# Patient Record
Sex: FEMALE | Race: WHITE | Employment: OTHER | URBAN - NONMETROPOLITAN AREA
[De-identification: names, ages, dates, MRNs, and addresses within clinical notes are randomized per-mention and may not be internally consistent; named-entity substitution may affect disease eponyms.]

---

## 2017-10-19 ENCOUNTER — HOSPITAL ENCOUNTER (OUTPATIENT)
Dept: INTERVENTIONAL RADIOLOGY/VASCULAR | Age: 82
Discharge: HOME | End: 2017-10-19
Payer: MEDICARE

## 2017-10-19 VITALS
TEMPERATURE: 97.3 F | HEART RATE: 73 BPM | OXYGEN SATURATION: 97 % | SYSTOLIC BLOOD PRESSURE: 123 MMHG | RESPIRATION RATE: 16 BRPM | DIASTOLIC BLOOD PRESSURE: 80 MMHG

## 2017-10-19 PROCEDURE — 62323 NJX INTERLAMINAR LMBR/SAC: CPT | Performed by: RADIOLOGY

## 2017-10-19 PROCEDURE — 6360000002 HC RX W HCPCS

## 2017-10-19 PROCEDURE — 2500000003 HC RX 250 WO HCPCS

## 2017-10-19 PROCEDURE — 6360000004 HC RX CONTRAST MEDICATION: Performed by: RADIOLOGY

## 2017-10-19 RX ORDER — TRAMADOL HYDROCHLORIDE 50 MG/1
50 TABLET ORAL EVERY 6 HOURS PRN
COMMUNITY
End: 2020-06-01

## 2017-10-19 RX ORDER — FENOFIBRATE 160 MG/1
160 TABLET ORAL DAILY
COMMUNITY

## 2017-10-19 RX ORDER — BUPIVACAINE HYDROCHLORIDE 2.5 MG/ML
5 INJECTION, SOLUTION EPIDURAL; INFILTRATION; INTRACAUDAL ONCE
Status: COMPLETED | OUTPATIENT
Start: 2017-10-19 | End: 2017-10-19

## 2017-10-19 RX ORDER — METHYLPREDNISOLONE ACETATE 80 MG/ML
80 INJECTION, SUSPENSION INTRA-ARTICULAR; INTRALESIONAL; INTRAMUSCULAR; SOFT TISSUE ONCE
Status: COMPLETED | OUTPATIENT
Start: 2017-10-19 | End: 2017-10-19

## 2017-10-19 RX ORDER — FLUOXETINE HYDROCHLORIDE 20 MG/1
20 CAPSULE ORAL DAILY
COMMUNITY
End: 2019-10-03

## 2017-10-19 RX ORDER — MULTIVIT-MINS NO.63/IRON/FOLIC 27 MG-1 MG
TABLET ORAL
COMMUNITY

## 2017-10-19 RX ORDER — PRAVASTATIN SODIUM 80 MG/1
80 TABLET ORAL DAILY
COMMUNITY

## 2017-10-19 RX ORDER — CARVEDILOL 6.25 MG/1
6.25 TABLET ORAL 2 TIMES DAILY
COMMUNITY

## 2017-10-19 RX ADMIN — METHYLPREDNISOLONE ACETATE 80 MG: 80 INJECTION, SUSPENSION INTRA-ARTICULAR; INTRALESIONAL; INTRAMUSCULAR; SOFT TISSUE at 10:56

## 2017-10-19 RX ADMIN — IOHEXOL 1 ML: 180 INJECTION INTRAVENOUS at 10:56

## 2017-10-19 RX ADMIN — BUPIVACAINE HYDROCHLORIDE 2.5 MG: 2.5 INJECTION, SOLUTION EPIDURAL; INFILTRATION; INTRACAUDAL at 10:56

## 2017-10-19 ASSESSMENT — PAIN SCALES - GENERAL
PAINLEVEL_OUTOF10: 0
PAINLEVEL_OUTOF10: 0
PAINLEVEL_OUTOF10: 5
PAINLEVEL_OUTOF10: 0

## 2017-10-19 NOTE — PROGRESS NOTES
Department of Radiology  Post Procedure Progress Note    Pre-Procedure Diagnosis:  Lumbar radiculopathy     Procedure Performed:  Epidural block/steroid injection      Anesthesia: local     Findings: successful    Immediate Complications:  None    Estimated Blood Loss: minimal    SEE DICTATED PROCEDURE NOTE FOR COMPLETE DETAILS.       Ilsa Cm MD   10/19/2017 10:57 AM

## 2017-10-19 NOTE — PROGRESS NOTES
1042 Pt arrived in IR. C/O pain to \"tailbone\" that wraps around to right groin. 5/10. Denies any numbness/tingling to bilateral lower extremities. Last dose Xarelto on 10/16/17  1051 Dr. Levi Ramírez in to evaluate pt.   1054 Procedure started. 1056 Procedure complete. Pt tolerated well. 1059 Pt transported to [facility] [place] via cart. Skin pink, warm, dry. Respirations even and unlabored. Pt denies c/o pain. Pedal push/pull equal and strong. Pt c/o slight tingling to bilateral feet.

## 2017-10-19 NOTE — PROGRESS NOTES
1020:  ARRIVES AMBULATORY FOR EPIDURAL. PROCESS REVIEWED ANDPT RIGHTS AND RESPONSIBILITIES OFFERED TO PT.  DAUGHTER ACCOMPANIES. 1038:  PEDAL PUSH AND PULL EQUAL AND STRONG. TO IR PER CART  1115:  PEDAL PUSH AND PULL EQUAL AND STRONG. STATES NUMBNESS IN RIGHT FOOT IS IMPROVING. DENIES PAIN. DRINKING JUICE. 1130:  DENIES NUMBNESS NOW  1140:  PEDAL PUSH AND PULL EQUAL AND STRONG. DENIES NUMBNESS.  LUMBAR BAND AID DRY. AMBULATES AS PRIOR TO PROCEDURE.  1145:  DISCHARGE INFORMATION GIVEN WITH  VERBAL  UNDERSTANDING. 1150:  PT DISCHARGED VIA WC IN CARE OF DAUGHTER.   DENIES PAIN, OR NUMBNESS.    _M___ Safety:       (Environmental)   [place] to environment   Ensure ID band is correct and in place/ allergy band as needed   Assess for fall risk   Initiate fall precautions as applicable (fall band, side rails, etc.)   Call light within reach   Bed in low position/ wheels locked    _M___ Pain:        Assess pain level and characteristics   Administer analgesics as ordered   Assess effectiveness of pain management and report to MD as needed    _M___ Knowledge Deficit:   Assess baseline knowledge   Provide teaching at level of understanding   Provide teaching via preferred learning method   Evaluate teaching effectiveness    _M___ Hemodynamic/Respiratory Status:       (Pre and Post Procedure Monitoring)   Assess/Monitor vital signs and LOC   Assess Baseline SpO2 prior to any sedation   Obtain weight/height   Assess vital signs/ LOC until patient meets discharge criteria   Monitor procedure site and notify MD of any issues    _

## 2018-06-27 RX ORDER — BUPIVACAINE HYDROCHLORIDE 2.5 MG/ML
5 INJECTION, SOLUTION EPIDURAL; INFILTRATION; INTRACAUDAL ONCE
Status: CANCELLED | OUTPATIENT
Start: 2018-06-27 | End: 2018-06-27

## 2018-06-27 RX ORDER — METHYLPREDNISOLONE ACETATE 80 MG/ML
80 INJECTION, SUSPENSION INTRA-ARTICULAR; INTRALESIONAL; INTRAMUSCULAR; SOFT TISSUE ONCE
Status: CANCELLED | OUTPATIENT
Start: 2018-06-27 | End: 2018-06-27

## 2018-06-28 ENCOUNTER — HOSPITAL ENCOUNTER (OUTPATIENT)
Dept: INTERVENTIONAL RADIOLOGY/VASCULAR | Age: 83
Discharge: HOME | End: 2018-06-28
Payer: MEDICARE

## 2018-06-28 VITALS
TEMPERATURE: 96.9 F | WEIGHT: 135 LBS | HEART RATE: 71 BPM | DIASTOLIC BLOOD PRESSURE: 73 MMHG | OXYGEN SATURATION: 99 % | RESPIRATION RATE: 16 BRPM | SYSTOLIC BLOOD PRESSURE: 131 MMHG

## 2018-06-28 PROCEDURE — 62323 NJX INTERLAMINAR LMBR/SAC: CPT

## 2018-06-28 PROCEDURE — 6360000004 HC RX CONTRAST MEDICATION: Performed by: RADIOLOGY

## 2018-06-28 PROCEDURE — 2500000003 HC RX 250 WO HCPCS

## 2018-06-28 PROCEDURE — 6360000002 HC RX W HCPCS

## 2018-06-28 RX ORDER — BUPIVACAINE HYDROCHLORIDE 2.5 MG/ML
5 INJECTION, SOLUTION EPIDURAL; INFILTRATION; INTRACAUDAL ONCE
Status: COMPLETED | OUTPATIENT
Start: 2018-06-28 | End: 2018-06-28

## 2018-06-28 RX ORDER — METHYLPREDNISOLONE ACETATE 80 MG/ML
80 INJECTION, SUSPENSION INTRA-ARTICULAR; INTRALESIONAL; INTRAMUSCULAR; SOFT TISSUE ONCE
Status: COMPLETED | OUTPATIENT
Start: 2018-06-28 | End: 2018-06-28

## 2018-06-28 RX ADMIN — BUPIVACAINE HYDROCHLORIDE 2 ML: 2.5 INJECTION, SOLUTION EPIDURAL; INFILTRATION; INTRACAUDAL at 13:33

## 2018-06-28 RX ADMIN — METHYLPREDNISOLONE ACETATE 80 MG: 80 INJECTION, SUSPENSION INTRA-ARTICULAR; INTRALESIONAL; INTRAMUSCULAR; SOFT TISSUE at 13:34

## 2018-06-28 RX ADMIN — IOHEXOL 1 ML: 180 INJECTION INTRAVENOUS at 13:32

## 2018-06-28 ASSESSMENT — PAIN - FUNCTIONAL ASSESSMENT: PAIN_FUNCTIONAL_ASSESSMENT: 0-10

## 2019-10-03 ENCOUNTER — HOSPITAL ENCOUNTER (OUTPATIENT)
Dept: INTERVENTIONAL RADIOLOGY/VASCULAR | Age: 84
Discharge: HOME | End: 2019-10-03
Payer: MEDICARE

## 2019-10-03 VITALS
BODY MASS INDEX: 24.56 KG/M2 | HEIGHT: 63 IN | HEART RATE: 64 BPM | TEMPERATURE: 98 F | RESPIRATION RATE: 18 BRPM | SYSTOLIC BLOOD PRESSURE: 131 MMHG | WEIGHT: 138.6 LBS | OXYGEN SATURATION: 97 % | DIASTOLIC BLOOD PRESSURE: 85 MMHG

## 2019-10-03 PROCEDURE — 2500000003 HC RX 250 WO HCPCS

## 2019-10-03 PROCEDURE — 6360000002 HC RX W HCPCS

## 2019-10-03 PROCEDURE — 6360000004 HC RX CONTRAST MEDICATION: Performed by: RADIOLOGY

## 2019-10-03 PROCEDURE — 2709999900 HC NON-CHARGEABLE SUPPLY

## 2019-10-03 PROCEDURE — 62323 NJX INTERLAMINAR LMBR/SAC: CPT | Performed by: RADIOLOGY

## 2019-10-03 RX ORDER — METHYLPREDNISOLONE ACETATE 80 MG/ML
80 INJECTION, SUSPENSION INTRA-ARTICULAR; INTRALESIONAL; INTRAMUSCULAR; SOFT TISSUE ONCE
Status: COMPLETED | OUTPATIENT
Start: 2019-10-03 | End: 2019-10-03

## 2019-10-03 RX ORDER — BUPIVACAINE HYDROCHLORIDE 2.5 MG/ML
5 INJECTION, SOLUTION EPIDURAL; INFILTRATION; INTRACAUDAL ONCE
Status: COMPLETED | OUTPATIENT
Start: 2019-10-03 | End: 2019-10-03

## 2019-10-03 RX ADMIN — BUPIVACAINE HYDROCHLORIDE 2 ML: 2.5 INJECTION, SOLUTION EPIDURAL; INFILTRATION; INTRACAUDAL at 09:50

## 2019-10-03 RX ADMIN — IOHEXOL 1 ML: 180 INJECTION INTRAVENOUS at 09:50

## 2019-10-03 RX ADMIN — METHYLPREDNISOLONE ACETATE 80 MG: 80 INJECTION, SUSPENSION INTRA-ARTICULAR; INTRALESIONAL; INTRAMUSCULAR; SOFT TISSUE at 09:50

## 2019-10-03 ASSESSMENT — PAIN - FUNCTIONAL ASSESSMENT
PAIN_FUNCTIONAL_ASSESSMENT: 0-10
PAIN_FUNCTIONAL_ASSESSMENT: 0-10

## 2019-10-03 ASSESSMENT — PAIN SCALES - GENERAL
PAINLEVEL_OUTOF10: 0
PAINLEVEL_OUTOF10: 0

## 2019-10-03 ASSESSMENT — PAIN DESCRIPTION - DESCRIPTORS: DESCRIPTORS: ACHING

## 2020-06-01 ENCOUNTER — OFFICE VISIT (OUTPATIENT)
Dept: FAMILY MEDICINE CLINIC | Age: 85
End: 2020-06-01
Payer: MEDICARE

## 2020-06-01 VITALS
HEART RATE: 66 BPM | HEIGHT: 62 IN | DIASTOLIC BLOOD PRESSURE: 80 MMHG | OXYGEN SATURATION: 98 % | WEIGHT: 144 LBS | SYSTOLIC BLOOD PRESSURE: 120 MMHG | BODY MASS INDEX: 26.5 KG/M2

## 2020-06-01 PROBLEM — N32.81 OVERACTIVE BLADDER: Status: ACTIVE | Noted: 2020-06-01

## 2020-06-01 PROBLEM — I10 ESSENTIAL HYPERTENSION: Status: ACTIVE | Noted: 2020-06-01

## 2020-06-01 PROBLEM — E78.2 MIXED HYPERLIPIDEMIA: Status: ACTIVE | Noted: 2020-06-01

## 2020-06-01 PROBLEM — Z86.718 HISTORY OF DEEP VEIN THROMBOSIS (DVT) OF LOWER EXTREMITY: Status: ACTIVE | Noted: 2020-06-01

## 2020-06-01 PROCEDURE — 99214 OFFICE O/P EST MOD 30 MIN: CPT | Performed by: NURSE PRACTITIONER

## 2020-06-01 RX ORDER — MULTIVITAMIN
CAPSULE ORAL
COMMUNITY

## 2020-06-01 RX ORDER — PREDNISONE 10 MG/1
10 TABLET ORAL DAILY
COMMUNITY
End: 2020-06-01

## 2020-06-01 RX ORDER — SOLIFENACIN SUCCINATE 10 MG/1
5 TABLET, FILM COATED ORAL DAILY
COMMUNITY

## 2020-06-01 RX ORDER — ACETAMINOPHEN 325 MG/1
650 TABLET ORAL EVERY 6 HOURS PRN
COMMUNITY

## 2020-06-01 ASSESSMENT — PATIENT HEALTH QUESTIONNAIRE - PHQ9
SUM OF ALL RESPONSES TO PHQ QUESTIONS 1-9: 0
2. FEELING DOWN, DEPRESSED OR HOPELESS: 0
SUM OF ALL RESPONSES TO PHQ QUESTIONS 1-9: 0
1. LITTLE INTEREST OR PLEASURE IN DOING THINGS: 0
SUM OF ALL RESPONSES TO PHQ9 QUESTIONS 1 & 2: 0

## 2020-06-01 ASSESSMENT — ENCOUNTER SYMPTOMS
ABDOMINAL PAIN: 0
SHORTNESS OF BREATH: 0
WHEEZING: 0
COUGH: 0

## 2020-06-01 NOTE — PROGRESS NOTES
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  Authorizing Provider   Multiple Vitamin (MULTIVITAMIN) capsule Take by mouth Yes Historical Provider, MD   predniSONE (DELTASONE) 10 MG tablet Take 10 mg by mouth daily Yes Historical Provider, MD   solifenacin (VESICARE) 10 MG tablet Take 5 mg by mouth daily Yes Historical Provider, MD   acetaminophen (TYLENOL) 325 MG tablet Take 650 mg by mouth every 6 hours as needed for Pain Yes Historical Provider, MD   rivaroxaban (XARELTO) 20 MG TABS tablet Take 20 mg by mouth Yes Historical Provider, MD   pravastatin (PRAVACHOL) 80 MG tablet Take 80 mg by mouth daily Yes Historical Provider, MD   fenofibrate 160 MG tablet Take 160 mg by mouth daily Yes Historical Provider, MD   carvedilol (COREG) 6.25 MG tablet Take 6.25 mg by mouth 2 times daily Yes Historical Provider, MD   traMADol (ULTRAM) 50 MG tablet Take 50 mg by mouth every 6 hours as needed for Pain  Historical Provider, MD   Prenatal Vit-Fe Fumarate-FA (M-VIT) TABS Take by mouth

## 2020-06-08 ENCOUNTER — HOSPITAL ENCOUNTER (OUTPATIENT)
Age: 85
Setting detail: SPECIMEN
Discharge: HOME | End: 2020-06-08
Payer: MEDICARE

## 2020-06-08 LAB
ALBUMIN SERPL-MCNC: 4.6 G/DL (ref 3.5–5.2)
ALBUMIN/GLOBULIN RATIO: 2 (ref 1–2.5)
ALP BLD-CCNC: 39 U/L (ref 35–104)
ALT SERPL-CCNC: 19 U/L (ref 5–33)
ANION GAP SERPL CALCULATED.3IONS-SCNC: 13 MMOL/L (ref 9–17)
AST SERPL-CCNC: 26 U/L
BILIRUB SERPL-MCNC: 0.63 MG/DL (ref 0.3–1.2)
BUN BLDV-MCNC: 18 MG/DL (ref 8–23)
BUN/CREAT BLD: 16 (ref 9–20)
CALCIUM SERPL-MCNC: 10.1 MG/DL (ref 8.6–10.4)
CHLORIDE BLD-SCNC: 100 MMOL/L (ref 98–107)
CHOLESTEROL/HDL RATIO: 2.9
CHOLESTEROL: 182 MG/DL
CO2: 28 MMOL/L (ref 20–31)
CREAT SERPL-MCNC: 1.12 MG/DL (ref 0.5–0.9)
GFR AFRICAN AMERICAN: 56 ML/MIN
GFR NON-AFRICAN AMERICAN: 46 ML/MIN
GFR SERPL CREATININE-BSD FRML MDRD: ABNORMAL ML/MIN/{1.73_M2}
GFR SERPL CREATININE-BSD FRML MDRD: ABNORMAL ML/MIN/{1.73_M2}
GLUCOSE BLD-MCNC: 129 MG/DL (ref 70–99)
HDLC SERPL-MCNC: 62 MG/DL
LDL CHOLESTEROL: 71 MG/DL (ref 0–130)
POTASSIUM SERPL-SCNC: 4.8 MMOL/L (ref 3.7–5.3)
SODIUM BLD-SCNC: 141 MMOL/L (ref 135–144)
TOTAL PROTEIN: 6.9 G/DL (ref 6.4–8.3)
TRIGL SERPL-MCNC: 245 MG/DL
TSH SERPL DL<=0.05 MIU/L-ACNC: 2.56 MIU/L (ref 0.3–5)
VLDLC SERPL CALC-MCNC: ABNORMAL MG/DL (ref 1–30)

## 2020-06-08 PROCEDURE — 80053 COMPREHEN METABOLIC PANEL: CPT

## 2020-06-08 PROCEDURE — 36415 COLL VENOUS BLD VENIPUNCTURE: CPT

## 2020-06-08 PROCEDURE — 84443 ASSAY THYROID STIM HORMONE: CPT

## 2020-06-08 PROCEDURE — 80061 LIPID PANEL: CPT

## 2020-06-09 ENCOUNTER — TELEPHONE (OUTPATIENT)
Dept: FAMILY MEDICINE CLINIC | Age: 85
End: 2020-06-09

## 2020-09-02 RX ORDER — BUPIVACAINE HYDROCHLORIDE 2.5 MG/ML
5 INJECTION, SOLUTION EPIDURAL; INFILTRATION; INTRACAUDAL ONCE
Status: CANCELLED | OUTPATIENT
Start: 2020-09-02 | End: 2020-09-02

## 2020-09-02 RX ORDER — METHYLPREDNISOLONE ACETATE 80 MG/ML
80 INJECTION, SUSPENSION INTRA-ARTICULAR; INTRALESIONAL; INTRAMUSCULAR; SOFT TISSUE ONCE
Status: CANCELLED | OUTPATIENT
Start: 2020-09-02 | End: 2020-09-02

## 2020-09-03 ENCOUNTER — HOSPITAL ENCOUNTER (OUTPATIENT)
Dept: INTERVENTIONAL RADIOLOGY/VASCULAR | Age: 85
Discharge: HOME | End: 2020-09-03
Payer: MEDICARE

## 2020-09-03 VITALS
RESPIRATION RATE: 16 BRPM | SYSTOLIC BLOOD PRESSURE: 140 MMHG | HEART RATE: 61 BPM | WEIGHT: 143 LBS | TEMPERATURE: 97.8 F | BODY MASS INDEX: 26.16 KG/M2 | DIASTOLIC BLOOD PRESSURE: 77 MMHG | OXYGEN SATURATION: 96 %

## 2020-09-03 PROCEDURE — 2709999900 HC NON-CHARGEABLE SUPPLY

## 2020-09-03 PROCEDURE — 6360000002 HC RX W HCPCS

## 2020-09-03 PROCEDURE — 6360000004 HC RX CONTRAST MEDICATION: Performed by: RADIOLOGY

## 2020-09-03 PROCEDURE — 62323 NJX INTERLAMINAR LMBR/SAC: CPT | Performed by: RADIOLOGY

## 2020-09-03 RX ORDER — GABAPENTIN 100 MG/1
100 CAPSULE ORAL DAILY
COMMUNITY

## 2020-09-03 RX ORDER — METHYLPREDNISOLONE ACETATE 80 MG/ML
80 INJECTION, SUSPENSION INTRA-ARTICULAR; INTRALESIONAL; INTRAMUSCULAR; SOFT TISSUE ONCE
Status: DISCONTINUED | OUTPATIENT
Start: 2020-09-03 | End: 2020-09-03

## 2020-09-03 RX ORDER — DEXAMETHASONE SODIUM PHOSPHATE 4 MG/ML
4 INJECTION, SOLUTION INTRA-ARTICULAR; INTRALESIONAL; INTRAMUSCULAR; INTRAVENOUS; SOFT TISSUE ONCE
Status: COMPLETED | OUTPATIENT
Start: 2020-09-03 | End: 2020-09-03

## 2020-09-03 RX ORDER — BUPIVACAINE HYDROCHLORIDE 2.5 MG/ML
5 INJECTION, SOLUTION EPIDURAL; INFILTRATION; INTRACAUDAL ONCE
Status: COMPLETED | OUTPATIENT
Start: 2020-09-03 | End: 2020-09-03

## 2020-09-03 RX ORDER — TRAMADOL HYDROCHLORIDE 50 MG/1
50 TABLET ORAL EVERY 6 HOURS PRN
COMMUNITY

## 2020-09-03 RX ADMIN — DEXAMETHASONE SODIUM PHOSPHATE 4 MG: 4 INJECTION, SOLUTION INTRA-ARTICULAR; INTRALESIONAL; INTRAMUSCULAR; INTRAVENOUS; SOFT TISSUE at 11:04

## 2020-09-03 RX ADMIN — BUPIVACAINE HYDROCHLORIDE 2 ML: 2.5 INJECTION, SOLUTION EPIDURAL; INFILTRATION; INTRACAUDAL at 11:04

## 2020-09-03 RX ADMIN — IOHEXOL 2 ML: 180 INJECTION INTRAVENOUS at 11:04

## 2020-09-03 ASSESSMENT — PAIN SCALES - GENERAL
PAINLEVEL_OUTOF10: 7
PAINLEVEL_OUTOF10: 0
PAINLEVEL_OUTOF10: 0

## 2020-09-03 ASSESSMENT — PAIN DESCRIPTION - DESCRIPTORS: DESCRIPTORS: ACHING

## 2020-09-03 ASSESSMENT — PAIN - FUNCTIONAL ASSESSMENT: PAIN_FUNCTIONAL_ASSESSMENT: 0-10

## 2020-09-03 NOTE — PROGRESS NOTES
1121 pt back from specials. Vitals stable. Pt denies pain. Pedal push and hand grasp equal and strong bilaterally. bandaid clean, dry and intact. 1145 vitals stable. bandaid unchanged. Pt ambulating appropriately. Pt discharged ambulatory with instructions with no complaints.            __m__ Safety:       (Environmental)   [place] to environment   Ensure ID band is correct and in place/ allergy band as needed   Assess for fall risk   Initiate fall precautions as applicable (fall band, side rails, etc.)   Call light within reach   Bed in low position/ wheels locked    _m___ Pain:        Assess pain level and characteristics   Administer analgesics as ordered   Assess effectiveness of pain management and report to MD as needed    __m__ Knowledge Deficit:   Assess baseline knowledge   Provide teaching at level of understanding   Provide teaching via preferred learning method   Evaluate teaching effectiveness    _m___ Hemodynamic/Respiratory Status:       (Pre and Post Procedure Monitoring)   Assess/Monitor vital signs and LOC   Assess Baseline SpO2 prior to any sedation   Obtain weight/height   Assess vital signs/ LOC until patient meets discharge criteria   Monitor procedure site and notify MD of any issues    __m__ Infection-Risk of Central Venous Catheter:   Monitor for infection signs and symptoms (catheter site redness, temperature elevation, etc)   Assess for infection risks   Educate regarding infection prevention   Manage central venous catheter (flushes/ dressing changes per protocol)

## 2020-09-03 NOTE — PROGRESS NOTES
1048 Patient received in IR for lumbar epidural.   1054 This procedure has been fully reviewed with the patient and written informed consent has been obtained. Pt states lower back pain on a scale of 7/10. Pain on the same side. 1100 Procedure started with Dr. Donna Snider  [phone] Procedure completed; patient tolerated well. Band aid to back; no bleeding noted. 1107 Patient on cart; comfort ensured. Pt states pain is a 3/10. Pedal push and pull strong and equal.   1115 Patient taken to OPN via cart.

## 2020-09-03 NOTE — PROGRESS NOTES
1020:  ARRIVES AMBULATORY USING CANE, WITH DAUGHTER, TO HAVE EPIDURAL. PROCESS REVIEWED AND PT RIGHTS AND RESPONSIBILITIES OFFERED TO PT. PEDAL PUSH AND PULL EQUAL AND STRONG.

## 2020-09-03 NOTE — PROGRESS NOTES
Formulation and discussion of sedation / procedure plans, risks, benefits, side effects and alternatives with patient and/or responsible adult completed.     Electronically signed by Wil Le MD on 9/3/2020 at 10:56 AM

## 2024-02-02 ENCOUNTER — HOSPITAL ENCOUNTER (OUTPATIENT)
Age: 89
Setting detail: SPECIMEN
Discharge: HOME | End: 2024-02-02

## 2024-02-02 LAB
ANION GAP SERPL CALCULATED.3IONS-SCNC: 9 MMOL/L (ref 9–17)
BASOPHILS # BLD: 0.05 K/UL (ref 0–0.2)
BASOPHILS NFR BLD: 1 % (ref 0–2)
BUN SERPL-MCNC: 16 MG/DL (ref 8–23)
BUN/CREAT SERPL: 20 (ref 9–20)
CALCIUM SERPL-MCNC: 9.2 MG/DL (ref 8.6–10.4)
CHLORIDE SERPL-SCNC: 102 MMOL/L (ref 98–107)
CO2 SERPL-SCNC: 28 MMOL/L (ref 20–31)
CREAT SERPL-MCNC: 0.8 MG/DL (ref 0.5–0.9)
EOSINOPHIL # BLD: 0.29 K/UL (ref 0–0.44)
EOSINOPHILS RELATIVE PERCENT: 3 % (ref 1–4)
ERYTHROCYTE [DISTWIDTH] IN BLOOD BY AUTOMATED COUNT: 16.3 % (ref 11.8–14.4)
GFR SERPL CREATININE-BSD FRML MDRD: >60 ML/MIN/1.73M2
GLUCOSE SERPL-MCNC: 107 MG/DL (ref 70–99)
HCT VFR BLD AUTO: 25.3 % (ref 36.3–47.1)
HGB BLD-MCNC: 8.1 G/DL (ref 11.9–15.1)
IMM GRANULOCYTES # BLD AUTO: 0.22 K/UL (ref 0–0.3)
IMM GRANULOCYTES NFR BLD: 2 %
LYMPHOCYTES NFR BLD: 1.65 K/UL (ref 1.1–3.7)
LYMPHOCYTES RELATIVE PERCENT: 15 % (ref 24–43)
MCH RBC QN AUTO: 30.7 PG (ref 25.2–33.5)
MCHC RBC AUTO-ENTMCNC: 32 G/DL (ref 25.2–33.5)
MCV RBC AUTO: 95.8 FL (ref 82.6–102.9)
MONOCYTES NFR BLD: 1.21 K/UL (ref 0.1–1.2)
MONOCYTES NFR BLD: 11 % (ref 3–12)
NEUTROPHILS NFR BLD: 68 % (ref 36–65)
NEUTS SEG NFR BLD: 7.45 K/UL (ref 1.5–8.1)
NRBC BLD-RTO: 0 PER 100 WBC
PLATELET # BLD AUTO: 468 K/UL (ref 138–453)
PMV BLD AUTO: 8.7 FL (ref 8.1–13.5)
POTASSIUM SERPL-SCNC: 4.6 MMOL/L (ref 3.7–5.3)
RBC # BLD AUTO: 2.64 M/UL (ref 3.95–5.11)
RBC # BLD: ABNORMAL 10*6/UL
SODIUM SERPL-SCNC: 139 MMOL/L (ref 135–144)
WBC OTHER # BLD: 10.9 K/UL (ref 3.5–11.3)

## 2024-02-02 PROCEDURE — 85025 COMPLETE CBC W/AUTO DIFF WBC: CPT

## 2024-02-02 PROCEDURE — 80048 BASIC METABOLIC PNL TOTAL CA: CPT

## 2024-02-02 PROCEDURE — 36415 COLL VENOUS BLD VENIPUNCTURE: CPT

## 2025-04-24 ENCOUNTER — APPOINTMENT (OUTPATIENT)
Dept: CT IMAGING | Age: 89
DRG: 064 | End: 2025-04-24
Payer: MEDICARE

## 2025-04-24 ENCOUNTER — APPOINTMENT (OUTPATIENT)
Dept: GENERAL RADIOLOGY | Age: 89
DRG: 064 | End: 2025-04-24
Payer: MEDICARE

## 2025-04-24 ENCOUNTER — HOSPITAL ENCOUNTER (INPATIENT)
Age: 89
LOS: 1 days | Discharge: HOSPICE | DRG: 064 | End: 2025-04-25
Attending: EMERGENCY MEDICINE | Admitting: STUDENT IN AN ORGANIZED HEALTH CARE EDUCATION/TRAINING PROGRAM
Payer: MEDICARE

## 2025-04-24 DIAGNOSIS — I61.5 INTRAVENTRICULAR HEMORRHAGE (HCC): Primary | ICD-10-CM

## 2025-04-24 PROBLEM — R40.2430 GLASGOW COMA SCALE TOTAL SCORE 3-8 (HCC): Status: ACTIVE | Noted: 2025-04-24

## 2025-04-24 PROBLEM — G91.1 OBSTRUCTIVE HYDROCEPHALUS (HCC): Status: ACTIVE | Noted: 2025-04-24

## 2025-04-24 LAB
ANION GAP SERPL CALCULATED.3IONS-SCNC: 15 MMOL/L (ref 9–16)
BUN BLD-MCNC: 12 MG/DL (ref 8–26)
BUN SERPL-MCNC: 12 MG/DL (ref 8–23)
CA-I BLD-SCNC: 0.62 MMOL/L (ref 1.15–1.33)
CALCIUM SERPL-MCNC: 8 MG/DL (ref 8.6–10.4)
CHLORIDE BLD-SCNC: 107 MMOL/L (ref 98–107)
CHLORIDE SERPL-SCNC: 107 MMOL/L (ref 98–107)
CO2 BLD CALC-SCNC: 27 MMOL/L (ref 22–30)
CO2 SERPL-SCNC: 24 MMOL/L (ref 20–31)
CREAT SERPL-MCNC: 0.7 MG/DL (ref 0.6–0.9)
EGFR, POC: 82 ML/MIN/1.73M2
GFR, ESTIMATED: 82 ML/MIN/1.73M2
GLUCOSE BLD-MCNC: 123 MG/DL (ref 74–100)
GLUCOSE BLD-MCNC: 127 MG/DL (ref 74–100)
GLUCOSE SERPL-MCNC: 122 MG/DL (ref 74–99)
HCO3 VENOUS: 27.2 MMOL/L (ref 22–29)
HCT VFR BLD AUTO: 28 % (ref 36–46)
MAGNESIUM SERPL-MCNC: 1.5 MG/DL (ref 1.7–2.3)
O2 SAT, VEN: 65.8 % (ref 60–85)
PCO2 VENOUS: 44.7 MM HG (ref 41–51)
PH VENOUS: 7.39 (ref 7.32–7.43)
PO2 VENOUS: 34.7 MM HG (ref 30–50)
POC ANION GAP: 17 MMOL/L (ref 7–16)
POC CREATININE: 0.7 MG/DL (ref 0.51–1.19)
POC HCO3: 27.1 MMOL/L (ref 21–28)
POC HEMOGLOBIN (CALC): 9.7 G/DL (ref 12–16)
POC LACTIC ACID: 1.9 MMOL/L (ref 0.56–1.39)
POC O2 SATURATION: 100 % (ref 94–98)
POC PCO2: 40 MM HG (ref 35–48)
POC PH: 7.44 (ref 7.35–7.45)
POC PO2: 541.7 MM HG (ref 83–108)
POSITIVE BASE EXCESS, ART: 2.7 MMOL/L (ref 0–3)
POSITIVE BASE EXCESS, VEN: 1.9 MMOL/L (ref 0–3)
POTASSIUM BLD-SCNC: 3.2 MMOL/L (ref 3.5–4.5)
POTASSIUM SERPL-SCNC: 3.5 MMOL/L (ref 3.7–5.3)
SODIUM BLD-SCNC: 149 MMOL/L (ref 138–146)
SODIUM SERPL-SCNC: 146 MMOL/L (ref 136–145)
TROPONIN I SERPL HS-MCNC: 108 NG/L (ref 0–14)

## 2025-04-24 PROCEDURE — 94002 VENT MGMT INPAT INIT DAY: CPT

## 2025-04-24 PROCEDURE — 6360000002 HC RX W HCPCS

## 2025-04-24 PROCEDURE — 82947 ASSAY GLUCOSE BLOOD QUANT: CPT

## 2025-04-24 PROCEDURE — 99291 CRITICAL CARE FIRST HOUR: CPT | Performed by: PSYCHIATRY & NEUROLOGY

## 2025-04-24 PROCEDURE — 96365 THER/PROPH/DIAG IV INF INIT: CPT

## 2025-04-24 PROCEDURE — 94761 N-INVAS EAR/PLS OXIMETRY MLT: CPT

## 2025-04-24 PROCEDURE — 99285 EMERGENCY DEPT VISIT HI MDM: CPT

## 2025-04-24 PROCEDURE — 5A1935Z RESPIRATORY VENTILATION, LESS THAN 24 CONSECUTIVE HOURS: ICD-10-PCS

## 2025-04-24 PROCEDURE — 84520 ASSAY OF UREA NITROGEN: CPT

## 2025-04-24 PROCEDURE — 82565 ASSAY OF CREATININE: CPT

## 2025-04-24 PROCEDURE — 2000000000 HC ICU R&B

## 2025-04-24 PROCEDURE — 82330 ASSAY OF CALCIUM: CPT

## 2025-04-24 PROCEDURE — 82803 BLOOD GASES ANY COMBINATION: CPT

## 2025-04-24 PROCEDURE — 85014 HEMATOCRIT: CPT

## 2025-04-24 PROCEDURE — 99291 CRITICAL CARE FIRST HOUR: CPT | Performed by: STUDENT IN AN ORGANIZED HEALTH CARE EDUCATION/TRAINING PROGRAM

## 2025-04-24 PROCEDURE — 80048 BASIC METABOLIC PNL TOTAL CA: CPT

## 2025-04-24 PROCEDURE — 2700000000 HC OXYGEN THERAPY PER DAY

## 2025-04-24 PROCEDURE — 84484 ASSAY OF TROPONIN QUANT: CPT

## 2025-04-24 PROCEDURE — 83735 ASSAY OF MAGNESIUM: CPT

## 2025-04-24 PROCEDURE — APPSS45 APP SPLIT SHARED TIME 31-45 MINUTES: Performed by: REGISTERED NURSE

## 2025-04-24 PROCEDURE — 93005 ELECTROCARDIOGRAM TRACING: CPT

## 2025-04-24 PROCEDURE — 83605 ASSAY OF LACTIC ACID: CPT

## 2025-04-24 PROCEDURE — 96375 TX/PRO/DX INJ NEW DRUG ADDON: CPT

## 2025-04-24 PROCEDURE — 71045 X-RAY EXAM CHEST 1 VIEW: CPT

## 2025-04-24 PROCEDURE — 6810039000 HC L1 TRAUMA ALERT

## 2025-04-24 PROCEDURE — 80051 ELECTROLYTE PANEL: CPT

## 2025-04-24 RX ORDER — CHLORHEXIDINE GLUCONATE ORAL RINSE 1.2 MG/ML
15 SOLUTION DENTAL 2 TIMES DAILY
Status: DISCONTINUED | OUTPATIENT
Start: 2025-04-24 | End: 2025-04-24

## 2025-04-24 RX ORDER — ACETAMINOPHEN 650 MG/1
650 SUPPOSITORY RECTAL EVERY 6 HOURS PRN
Status: CANCELLED | OUTPATIENT
Start: 2025-04-24

## 2025-04-24 RX ORDER — FENTANYL CITRATE-0.9 % NACL/PF 20 MCG/2ML
50 SYRINGE (ML) INTRAVENOUS EVERY 30 MIN PRN
Refills: 0 | Status: DISCONTINUED | OUTPATIENT
Start: 2025-04-24 | End: 2025-04-24

## 2025-04-24 RX ORDER — ONDANSETRON 2 MG/ML
4 INJECTION INTRAMUSCULAR; INTRAVENOUS EVERY 6 HOURS PRN
Status: CANCELLED | OUTPATIENT
Start: 2025-04-24

## 2025-04-24 RX ORDER — ONDANSETRON 4 MG/1
4 TABLET, ORALLY DISINTEGRATING ORAL EVERY 8 HOURS PRN
Status: CANCELLED | OUTPATIENT
Start: 2025-04-24

## 2025-04-24 RX ORDER — ACETAMINOPHEN 325 MG/1
650 TABLET ORAL EVERY 6 HOURS PRN
Status: CANCELLED | OUTPATIENT
Start: 2025-04-24

## 2025-04-24 RX ORDER — SODIUM CHLORIDE 9 MG/ML
INJECTION, SOLUTION INTRAVENOUS PRN
Status: CANCELLED | OUTPATIENT
Start: 2025-04-24

## 2025-04-24 RX ORDER — SODIUM CHLORIDE 0.9 % (FLUSH) 0.9 %
5-40 SYRINGE (ML) INJECTION PRN
Status: CANCELLED | OUTPATIENT
Start: 2025-04-24

## 2025-04-24 RX ORDER — MORPHINE SULFATE 2 MG/ML
2 INJECTION, SOLUTION INTRAMUSCULAR; INTRAVENOUS EVERY 30 MIN PRN
Status: DISCONTINUED | OUTPATIENT
Start: 2025-04-24 | End: 2025-04-26 | Stop reason: HOSPADM

## 2025-04-24 RX ORDER — PROPOFOL 10 MG/ML
INJECTION, EMULSION INTRAVENOUS
Status: COMPLETED
Start: 2025-04-24 | End: 2025-04-24

## 2025-04-24 RX ORDER — FAMOTIDINE 10 MG/ML
20 INJECTION, SOLUTION INTRAVENOUS 2 TIMES DAILY
Status: DISCONTINUED | OUTPATIENT
Start: 2025-04-24 | End: 2025-04-24

## 2025-04-24 RX ORDER — SODIUM CHLORIDE 9 MG/ML
INJECTION, SOLUTION INTRAVENOUS PRN
Status: DISCONTINUED | OUTPATIENT
Start: 2025-04-24 | End: 2025-04-24

## 2025-04-24 RX ORDER — CALCIUM GLUCONATE 20 MG/ML
2000 INJECTION, SOLUTION INTRAVENOUS ONCE
Status: COMPLETED | OUTPATIENT
Start: 2025-04-24 | End: 2025-04-24

## 2025-04-24 RX ORDER — SODIUM CHLORIDE 9 MG/ML
50 INJECTION, SOLUTION INTRAVENOUS ONCE
Status: DISCONTINUED | OUTPATIENT
Start: 2025-04-24 | End: 2025-04-24

## 2025-04-24 RX ORDER — ONDANSETRON 2 MG/ML
4 INJECTION INTRAMUSCULAR; INTRAVENOUS EVERY 6 HOURS PRN
Status: DISCONTINUED | OUTPATIENT
Start: 2025-04-24 | End: 2025-04-26 | Stop reason: HOSPADM

## 2025-04-24 RX ORDER — SODIUM CHLORIDE 9 MG/ML
INJECTION, SOLUTION INTRAVENOUS CONTINUOUS
Status: CANCELLED | OUTPATIENT
Start: 2025-04-24

## 2025-04-24 RX ORDER — PROPOFOL 10 MG/ML
5-50 INJECTION, EMULSION INTRAVENOUS CONTINUOUS
Status: DISCONTINUED | OUTPATIENT
Start: 2025-04-24 | End: 2025-04-24

## 2025-04-24 RX ORDER — SODIUM CHLORIDE 0.9 % (FLUSH) 0.9 %
5-40 SYRINGE (ML) INJECTION EVERY 12 HOURS SCHEDULED
Status: CANCELLED | OUTPATIENT
Start: 2025-04-24

## 2025-04-24 RX ORDER — LORAZEPAM 2 MG/ML
1 INJECTION INTRAMUSCULAR EVERY 4 HOURS PRN
Status: DISCONTINUED | OUTPATIENT
Start: 2025-04-24 | End: 2025-04-26 | Stop reason: HOSPADM

## 2025-04-24 RX ORDER — FENTANYL CITRATE-0.9 % NACL/PF 10 MCG/ML
25-200 PLASTIC BAG, INJECTION (ML) INTRAVENOUS CONTINUOUS
Refills: 0 | Status: DISCONTINUED | OUTPATIENT
Start: 2025-04-24 | End: 2025-04-24

## 2025-04-24 RX ORDER — GLYCOPYRROLATE 0.2 MG/ML
0.2 INJECTION INTRAMUSCULAR; INTRAVENOUS EVERY 4 HOURS PRN
Status: DISCONTINUED | OUTPATIENT
Start: 2025-04-24 | End: 2025-04-26 | Stop reason: HOSPADM

## 2025-04-24 RX ADMIN — PROPOFOL 20 MCG/KG/MIN: 10 INJECTION, EMULSION INTRAVENOUS at 15:08

## 2025-04-24 RX ADMIN — GLYCOPYRROLATE 0.2 MG: 0.2 INJECTION INTRAMUSCULAR; INTRAVENOUS at 18:37

## 2025-04-24 RX ADMIN — CALCIUM GLUCONATE 2000 MG: 20 INJECTION, SOLUTION INTRAVENOUS at 15:46

## 2025-04-24 RX ADMIN — MORPHINE SULFATE 2 MG: 2 INJECTION, SOLUTION INTRAMUSCULAR; INTRAVENOUS at 17:25

## 2025-04-24 RX ADMIN — FENTANYL CITRATE 50 MCG/HR: 0.05 INJECTION, SOLUTION INTRAMUSCULAR; INTRAVENOUS at 15:23

## 2025-04-24 RX ADMIN — MORPHINE SULFATE 2 MG: 2 INJECTION, SOLUTION INTRAMUSCULAR; INTRAVENOUS at 22:07

## 2025-04-24 RX ADMIN — PROTHROMBIN COMPLEX CONCENTRATE (HUMAN) 2500 UNITS: 25.5; 16.5; 24; 22; 22; 26 POWDER, FOR SOLUTION INTRAVENOUS at 15:21

## 2025-04-24 ASSESSMENT — PAIN SCALES - GENERAL: PAINLEVEL_OUTOF10: 0

## 2025-04-24 ASSESSMENT — PULMONARY FUNCTION TESTS
PIF_VALUE: 22
PIF_VALUE: 21
PIF_VALUE: 20

## 2025-04-24 NOTE — ED NOTES
89 yo with large intravent bld  Flight 10-15 min out from [facility] @ 1:31 PM EDT  Found down, LKW last night 1700  Obtunded, vomited  IV bld, on xarelto - do not have Kcentra  Given FFP, TXA  Intubated, versed  WBC 20k  Unasyn - possible aspiration  Pupils reactive prior to intubation    Accepted by Dr. Jacob

## 2025-04-24 NOTE — PROGRESS NOTES
Patient extubated per physician order. Patient extubated in usual fashion. Patient placed on  room air.     SHELLEY ELLISON RCP

## 2025-04-24 NOTE — CONSULTS
Department of Neurosurgery                                            Nurse Practitioner Consult Note      Reason for Consult:  IVH with hydrocephalus  Requesting Physician:  Osorio Dick  Neurosurgeon:   [x] Dr. García  [] Dr. Oh  [] Dr. Bautista  [] Dr. Aguilera      History Obtained From:  family member - daughter and step son, electronic medical record    CHIEF COMPLAINT:         Chief Complaint   Patient presents with    Altered Mental Status       HISTORY OF PRESENT ILLNESS:       The patient is a 89 y.o. female transferred from [facility] where she presented after being found unresponsive this morning by family. Last known well yesterday evening about 1700. On arrival to [facility] she was given etomidate and succinylcholine about 12:30 for intubation. CT head showed large intraventricular hemorrhage. Patient on xeralto at home. Given FFP and TXA at outside hospital. On arrival to [facility] she was on versed drip which was stopped and changed to propofol drip. She was given belfaxar and given 250 ml 3% HTS on arrival to [facility].       PAST MEDICAL HISTORY :       Past Medical History:        Diagnosis Date    Hyperlipemia        Past Surgical History:        Procedure Laterality Date    CATARACT REMOVAL Bilateral     HIP SURGERY Bilateral     TOTAL HIP    HYSTERECTOMY, TOTAL ABDOMINAL      JOINT REPLACEMENT Left     knee       Social History:   Social History     Socioeconomic History    Marital status:      Spouse name: Not on file    Number of children: Not on file    Years of education: Not on file    Highest education level: Not on file   Occupational History    Not on file   Tobacco Use    Smoking status: Never    Smokeless tobacco: Never   Substance and Sexual Activity    Alcohol use: No    Drug use: No    Sexual activity: Not on file   Other Topics Concern    Not on file   Social History Narrative    Not on file     Social Drivers of Health     Financial

## 2025-04-24 NOTE — H&P
Neuro ICU History & Physical    Patient Name: Tram Bailey  Patient : 1935  Room/Bed: 0119/0119-01  Code Status: DNR-CCA  Allergies: No Known Allergies    CHIEF COMPLAINT     Found down, unresponsiveness    HPI    History Obtained From: Chart review    The patient is a 89 y.o. female with history of hypertension, hyperlipidemia, CAD, PE on Xarelto, TIA, CKD who presents as a transfer from [facility] for intraventricular hemorrhage on CT scan of the head.  According to the notes at [facility], patient's last known well was approximately 1700 yesterday.  Her family came over today to bring her some zucchini bread and found her in her bed unresponsive.  EMS was called and they transported patient to [facility].  On arrival to [facility], patient had GCS of 8 and patient was intubated for airway protection.  CT scan of the head showed a large intraventricular hemorrhage. Patient did receive 1 g of TXA as well as 1 unit of FFP at the [facility] facility prior to transfer.  Patient also was noted to have a leukocytosis of 20 and did receive a dose of Unasyn due to concerns for possible aspiration.    On evaluation in the trauma bay, patient is intubated and sedated on propofol and fentanyl.  Patient was localizing to pain in her bilateral upper extremities.  Pupils equal and reactive.  No other signs of trauma noted.    Patient did receive a gram of calcium in the trauma bay due to peaked T waves noted on EKG.  Balfaxar was also ordered.    Repeat chest x-ray showed ET tube in satisfactory position as well as OG in the gastric fundus.    ICH score: 4    Admitted to ICU From: ER  Reason for ICU Admission: Intraparenchymal hemorrhage, intubated due to low GCS at [facility] facility       PATIENT HISTORY   Past Medical History:        Diagnosis Date    Hyperlipemia        Past Surgical History:        Procedure Laterality Date    CATARACT REMOVAL Bilateral     HIP SURGERY Bilateral     TOTAL HIP     tablet, Take 6.25 mg by mouth 2 times daily  Prenatal Vit-Fe Fumarate-FA (M-VIT) TABS, Take by mouth    Current Medications:  Current Facility-Administered Medications: propofol infusion, 5-50 mcg/kg/min, IntraVENous, Continuous  fentaNYL 2500 mcg/ 50 mL IV infusion,  mcg/hr, IntraVENous, Continuous  3% sodium chloride (HYPERTONIC) IV bolus infusion 250 mL, 250 mL, IntraVENous, Once  [COMPLETED] prothrombin complex human-lans (BALFAXAR) infusion 2,500 Units, 2,500 Units, IntraVENous, Once **FOLLOWED BY** 0.9 % sodium chloride infusion, 50 mL, IntraVENous, Once  0.9 % sodium chloride infusion, , IntraVENous, PRN  chlorhexidine (PERIDEX) 0.12 % solution 15 mL, 15 mL, Mouth/Throat, BID  famotidine (PEPCID) injection 20 mg, 20 mg, IntraVENous, BID    REVIEW OF SYSTEMS     Unable to assess as patient is currently intubated and sedated    PHYSICAL EXAM:     BP (!) 116/49   Pulse 57   Temp 98.6 °F (37 °C) (Oral)   Resp 18   Wt 51.8 kg (114 lb 3.2 oz)   SpO2 100%   BMI 20.89 kg/m²     PHYSICAL EXAM:  CONSTITUTIONAL:  Intubated and sedated.  Well developed, well nourished.  No signs of trauma.   HEAD:  normocephalic, atraumatic    EYES:  PERRLA   ENT:  moist mucous membranes   LUNGS:  No respiratory distress   CARDIOVASCULAR:  RRR   ABDOMEN:  Soft, no rigidity   NEUROLOGIC:  Mental Status: Intubated and sedated             Cranial Nerves:    PERRL    Motor Exam:    Localizing to pain in bilateral upper extremities  Not currently moving her lower extremities but patient was still on low-dose propofol and fentanyl infusions     SKIN No obvious ecchymosis, rashes, or lesions    NIH Stroke Scale Total (if not done complete detailed one below):    1a.  Level of consciousness:  3 - responds only with reflex motor or automatic effects or totally unresponsive, flaccid, areflexic  1b.  Level of consciousness questions:  2 - answers neither question correctly  1c.  Level of consciousness questions:  2 - performs neither

## 2025-04-24 NOTE — ED PROVIDER NOTES
[facility] EMERGENCY DEPARTMENT     Emergency Department     Faculty Attestation    I performed a history and physical examination of the patient and discussed management with the resident. I reviewed the resident’s note and agree with the documented findings and plan of care. Any areas of disagreement are noted on the chart. I was personally present for the key portions of any procedures. I have documented in the chart those procedures where I was not present during the key portions. I have reviewed the emergency nurses triage note. I agree with the chief complaint, past medical history, past surgical history, allergies, medications, social and family history as documented unless otherwise noted below. For Physician Assistant/ Nurse Practitioner cases/documentation I have personally evaluated this patient and have completed at least one if not all key elements of the E/M (history, physical exam, and MDM). Additional findings are as noted.    3:08 PM EDT    Patient transferred here from [facility] ER as a trauma alert and stroke alert.  Patient was found to have a large intraparenchymal hemorrhage on CT scan at [facility].  Patient was intubated while at [facility].  Patient was reportedly found unresponsive in bed today.  There was no history of trauma.  The trauma alert was canceled on patient arrival after the discovery that no trauma had occurred.  Patient is on Xarelto.  On arrival here, patient is intubated and unresponsive.  Breath sounds are equal bilaterally.  Will repeat chest x-ray.  Will await neurology recommendations.    EKG Interpretation    Interpreted by emergency department physician    Rhythm: normal sinus   Rate: normal  Axis: normal  Ectopy: none  Conduction: normal  ST Segments: normal  T Waves: non specific changes  Q Waves: none    Clinical Impression: non-specific EKG    MD Roseanne Montes MD  Attending Emergency  Physician

## 2025-04-24 NOTE — PROGRESS NOTES
[facility] HEALTH  [facility]  Emergency/Trauma Note    PATIENT NAME: Tram Bailey     Shift date: 4.24.2025  Shift day: Thursday   Shift # 1    Room # 0119/0119-01   Name: Tram Bailey            Age: 89 y.o.  Gender: female          Language: English   Yarsanism: Congregational   Principal Problem: Intraventricular hemorrhage (HCC)     Trauma/Incident type: Adult Trauma Alert / Stroke Alert    Admit Date & Time: 4/24/2025  2:48 PM  TRAUMA NAME: None    ADVANCE DIRECTIVES IN CHART?  Yes    NAME OF DECISION MAKER:   Primary Decision Maker: BerthabonLuna - Child - [phone]     RELATIONSHIP OF DECISION MAKER TO PATIENT: Daughter     EMERGENCY CONTACTS IN CHART:  Extended Emergency Contact Information  Primary Emergency Contact: HughLuna   [facility]  Home Phone: [phone]  Relation: Child  Secondary Emergency Contact: Mian Bailey  Home Phone: [phone]  Relation: Child  Preferred language: English   needed? No       PATIENT/EVENT DESCRIPTION:  Tram Bailey is a 89 y.o. female who arrived via EMT  from Facility Transfer  as a Adult Trauma Alert  due to Stroke-like issues.     Pt to be admitted to 0119/0119-01.         SPIRITUAL ASSESSMENT:  Patient Assessment: Unavailable for assessment    Support Assessment: a congruent response  Family members appears to show medium distress at this time, appearing Accepted, Calm, Concerned, Coping, Guilty, and Sad. Daughter (POA) states that the patient would not want to be a \"vegetable\" and believes that the patient would prioritize a quality life.  Family confirms that the patient had expressed her wishes not too long ago to them and expressed that they are a bit surprised that this is happening now after they just talked about such a situation with the patient.      Family appears to be sad but coping.  They seem to have moved to a level of acceptance and believe that the patient has lived a good life.

## 2025-04-24 NOTE — CONSULTS
Stroke Neurology Consult Note  Stroke Alert @ 2:47 PM  Arrival at bedside @ 2:53 PM    Reason for Consult:  ED Stroke Alert  Requesting Physician:  ED team   Endovascular Neurosurgeon: Malik West MD  Stroke Team: Malik West MD  History Obtained From:  electronic medical record  Chief Complaint:  Acute neurological deficits   Allergies:  Patient has no known allergies.    History of Presenting Illness     Tram Bailey is a 89-year-old female with a history of DVTs on Xarelto who was found unresponsive in bed by family earlier today and brought to [facility] ER as both a trauma and stroke alert. She was intubated en route and arrived with a GCS of 8. CT head revealed a large intraparenchymal hemorrhage, for which she received tranexamic acid and fresh frozen plasma. She was then transferred to our facility for higher-level care. On arrival, she was on minimal sedation, localized to central noxious stimuli with both upper extremities, and withdrew both lower extremities--less so on the right. A stat neurosurgery consult was placed with plans to insert an external ventricular drain (EVD). She will be admitted to the neurocritical care unit for ongoing management.      LKW: 5 PM 4/23/2025  NIHSS: 30  Modified Tate Scale: 5  SBP: 153  Glucose: Above 100  CT head without contrast: Large left hemispheric bleed with intraventricular extension  TNK: Not indicated  Endovascular: No suspicion for an LVO at this time, but patient is pending a CTA    Review of Systems   Unable to perform ROS: Patient unresponsive       Past Histories          Diagnosis Date    Hyperlipemia          Procedure Laterality Date    CATARACT REMOVAL Bilateral     HIP SURGERY Bilateral     TOTAL HIP    HYSTERECTOMY, TOTAL ABDOMINAL      JOINT REPLACEMENT Left     knee     Social History     Socioeconomic History    Marital status:      Spouse name: Not on file    Number of children: Not on file    Years of education: Not on file

## 2025-04-24 NOTE — PLAN OF CARE
Discussed with family, including both power of 's reviewed by documentation, describe goals of care.  According to the family, the patient would not want to be on a ventilator, or have any \"heroic measures\" taken.  Explained the difference between a full code, DNR CCA, and a DNR CC.    Family is aware of the overall prognosis of her condition, and understands that if she does recover if she will be irreversibly change neurologically.  Did explain to the family that given the fact that she is already intubated, it would be wise to eventually potentially talk about comfort care, however at this time a DNR CCA would be more in line with her interest and that when she does get up to the neuro ICU further discussions can be had about her overall prognosis and care.    Family is all in agreements.  Both power of 's are in agreements.  After thorough discussion, orders changed for DNR CCA.

## 2025-04-24 NOTE — PROGRESS NOTES
was contacted by Triage to escort family to Neuro ICU.  provided a supportive presence to family and brought them to waiting area.  met with family again during code status change.  provided a supportive presence through active listening and allowing space for feelings and emotions. Daughter shared many memories with .    Electronically signed by Nigel Reynoso on 4/24/2025 at 6:20 PM

## 2025-04-24 NOTE — ACP (ADVANCE CARE PLANNING)
Extensive conversation at bedside with patient's daughter and other extended family regarding current imaging findings and overall prognosis.  Patient family endorses speaking with neurosurgery regarding the extent of the injury and the need for EVD due to developing hydrocephalus, at this time family declines invasive neurosurgical intervention and endorses that patient would not like to be on a ventilator or receive life-sustaining interventions and has communicated her wishes to her family at length in the past.  I discussed the differences between CODE STATUS and patient's family ultimately elected to change CODE STATUS to DNR CC and withdraw care.  All questions and concerns answered at this time,  contacted for ongoing emotional support per family request.

## 2025-04-24 NOTE — PLAN OF CARE
Problem: Discharge Planning  Goal: Discharge to home or other facility with appropriate resources  Outcome: Progressing  Flowsheets (Taken 4/24/2025 1618)  Discharge to home or other facility with appropriate resources:   Identify barriers to discharge with patient and caregiver   Arrange for needed discharge resources and transportation as appropriate   Identify discharge learning needs (meds, wound care, etc)   Arrange for interpreters to assist at discharge as needed   Refer to discharge planning if patient needs post-hospital services based on physician order or complex needs related to functional status, cognitive ability or social support system     Problem: Pain  Goal: Verbalizes/displays adequate comfort level or baseline comfort level  Outcome: Progressing

## 2025-04-24 NOTE — ED PROVIDER NOTES
[facility] NEURO ICU  Emergency Department Encounter  Emergency Medicine Resident     Pt Name:Tram Bailey  MRN: [id number]  Birthdate 1/28/1935  Date of evaluation: 4/24/25  PCP:  Mayte Ramirez DO  Note Started: 3:56 PM EDT      CHIEF COMPLAINT       Chief Complaint   Patient presents with    Altered Mental Status       HISTORY OF PRESENT ILLNESS  (Location/Symptom, Timing/Onset, Context/Setting, Quality, Duration, Modifying Factors, Severity.)      Tram Bailey is a 89 y.o. female with significant pert medical history of hypertension, hyperlipidemia, CAD, PE on Xarelto, previous history of TIAs and CKD who presents with transfer from [facility] for altered mental status, found down in a chair, no fall, with potential for aspiration when the patient's family found her.    Stated that her last known well was 5 PM the night before, patient was found sometime in the late morning and was going to bring the patient as a kidney bed when she saw that she had vomited, aspirated, and was altered and nonresponsive.    At that time, the patient did have a pulse and was unknown downtime, patient's daughter went and called EMS to have them come and evaluate her.    Patient continued to be altered, had GCS of 8 at outlying facility, and is intubated with etomidate and succinylcholine.  A CT head was ordered which showed significant IVH and no shift, however did have cerebral edema at the time.    I was transferred here for neurosurgery evaluation and further management.  On initial arrival, the patient is intubated and sedated.  Pupils are PERRL, however patient did have bedside monitor showing concerns for peaked T waves concerning for hyperkalemia.  Patient did have succinylcholine, normal potassium of 4.7 prior to transfer, however given unknown downtime there is concern MI and for hyperkalemia.    EPOCH ordered, showing significant ionized calcium deficiency, 2 g of calcium gluconate ordered.  Troponins, EKG  Taking? Authorizing Provider   gabapentin (NEURONTIN) 100 MG capsule Take 100 mg by mouth Daily.    Sameer Gibson MD   traMADol (ULTRAM) 50 MG tablet Take 50 mg by mouth every 6 hours as needed for Pain.    Sameer Gibson MD   Multiple Vitamin (MULTIVITAMIN) capsule Take by mouth    Sameer Gibson MD   solifenacin (VESICARE) 10 MG tablet Take 5 mg by mouth daily    Sameer Gibson MD   acetaminophen (TYLENOL) 325 MG tablet Take 650 mg by mouth every 6 hours as needed for Pain    Sameer iGbson MD   rivaroxaban (XARELTO) 20 MG TABS tablet Take 20 mg by mouth    Sameer Gibson MD   pravastatin (PRAVACHOL) 80 MG tablet Take 80 mg by mouth daily    Sameer Gibson MD   fenofibrate 160 MG tablet Take 160 mg by mouth daily    Sameer Gibson MD   carvedilol (COREG) 6.25 MG tablet Take 6.25 mg by mouth 2 times daily    Sameer Gibson MD   Prenatal Vit-Fe Fumarate-FA (M-VIT) TABS Take by mouth    Sameer Gibson MD         REVIEW OF SYSTEMS       Review of Systems  Unable to due to patient's intubated status.  PHYSICAL EXAM      INITIAL VITALS:   BP (!) 122/59   Pulse 62   Temp 98.7 °F (37.1 °C) (Axillary)   Resp 18   Wt 51.8 kg (114 lb 3.2 oz)   SpO2 100%   BMI 20.89 kg/m²     Physical Exam  Pupils are PERRL bilaterally.  Trachea appears midline.  Breath sounds clear and equal bilaterally.  Patient is intubated.  Patient does withdraw to pain, movement in the upper and lower extremities.  Physical exam otherwise limited by intubated status.    DDX/DIAGNOSTIC RESULTS / EMERGENCY DEPARTMENT COURSE / MDM     Medical Decision Making  Tram Bailey is a 89 y.o. female with significant pert medical history of hypertension, hyperlipidemia, CAD, PE on Xarelto, previous history of TIAs and CKD who presents with transfer from [facility] for altered mental status, found down in a chair, no fall, with potential for aspiration when the patient's family

## 2025-04-25 VITALS
OXYGEN SATURATION: 91 % | TEMPERATURE: 100.9 F | RESPIRATION RATE: 29 BRPM | SYSTOLIC BLOOD PRESSURE: 165 MMHG | DIASTOLIC BLOOD PRESSURE: 96 MMHG | WEIGHT: 114.2 LBS | HEART RATE: 103 BPM | BODY MASS INDEX: 20.89 KG/M2

## 2025-04-25 LAB
EKG ATRIAL RATE: 61 BPM
EKG P AXIS: 29 DEGREES
EKG P-R INTERVAL: 148 MS
EKG Q-T INTERVAL: 490 MS
EKG QRS DURATION: 78 MS
EKG QTC CALCULATION (BAZETT): 493 MS
EKG R AXIS: -27 DEGREES
EKG T AXIS: 30 DEGREES
EKG VENTRICULAR RATE: 61 BPM

## 2025-04-25 PROCEDURE — 99291 CRITICAL CARE FIRST HOUR: CPT | Performed by: STUDENT IN AN ORGANIZED HEALTH CARE EDUCATION/TRAINING PROGRAM

## 2025-04-25 PROCEDURE — 6360000002 HC RX W HCPCS

## 2025-04-25 PROCEDURE — 93010 ELECTROCARDIOGRAM REPORT: CPT | Performed by: STUDENT IN AN ORGANIZED HEALTH CARE EDUCATION/TRAINING PROGRAM

## 2025-04-25 RX ADMIN — GLYCOPYRROLATE 0.2 MG: 0.2 INJECTION INTRAMUSCULAR; INTRAVENOUS at 08:50

## 2025-04-25 RX ADMIN — MORPHINE SULFATE 2 MG: 2 INJECTION, SOLUTION INTRAMUSCULAR; INTRAVENOUS at 08:50

## 2025-04-25 RX ADMIN — GLYCOPYRROLATE 0.2 MG: 0.2 INJECTION INTRAMUSCULAR; INTRAVENOUS at 14:34

## 2025-04-25 RX ADMIN — MORPHINE SULFATE 2 MG: 2 INJECTION, SOLUTION INTRAMUSCULAR; INTRAVENOUS at 21:07

## 2025-04-25 RX ADMIN — MORPHINE SULFATE 2 MG: 2 INJECTION, SOLUTION INTRAMUSCULAR; INTRAVENOUS at 14:19

## 2025-04-25 RX ADMIN — LORAZEPAM 1 MG: 2 INJECTION INTRAMUSCULAR; INTRAVENOUS at 14:45

## 2025-04-25 RX ADMIN — GLYCOPYRROLATE 0.2 MG: 0.2 INJECTION INTRAMUSCULAR; INTRAVENOUS at 19:56

## 2025-04-25 NOTE — PROGRESS NOTES
Report called to RADHA Phillips at [facility] inpatient [facility]. Current  time is 830pm.

## 2025-04-25 NOTE — CARE COORDINATION
Case Management   Daily Progress Note       Patient Name: Tram Bailey                   YOB: 1935  Diagnosis: Intraventricular hemorrhage (HCC) [I61.5]                         days  Length of Stay: 1  days    Anticipated Discharge Date: One day until discharge    Readmission Risk (Low < 19, Mod (19-27), High > 27): Readmission Risk Score: 10.7        Current Transitional Plan    [] Home Independently    [] Home with HC    [] Skilled Nursing Facility    [] Acute Rehabilitation    [] Long Term Acute Care (LTAC)    [x] Other: Inpatient Hospice    Plan for the Stay (Medical Management) :          Workflow Continuation (Additional Notes) :Met with the patient and her family. Initial assessment deferred due to code status.States they would like her closer to home. Requested a referral be sent to [facility]. Family states they do not want her to remain in [facility] until she passes. States the patient has pre-arranged her  with [facility] in [place], [place].    1118 Referral sent.    1140 Spoke with Allyssa with [facility]. States she will call the patients daughter. H&P and updated not faxed.    1151 Faxed transportation request to [facility] with 1400 .    1213 Spoke with Allyssa with [facility], they are able to accept the patient. Informed her transportation has been requested for 1400, writer to confirm    181 Spoke with Jacqueline with [facility]. Confirmed  time from [email or web address] is . Informed her a nurse will call report.    YADIRA GRUBBS  2025

## 2025-04-25 NOTE — CARE COORDINATION
Spoke with the patients daughter. States she is on her way. Writer requested she ask for case management when she arrives.

## 2025-04-25 NOTE — PLAN OF CARE
Problem: Discharge Planning  Goal: Discharge to home or other facility with appropriate resources  4/24/2025 2022 by Una Rizzo RN  Outcome: Progressing  Flowsheets (Taken 4/24/2025 2022)  Discharge to home or other facility with appropriate resources:   Identify barriers to discharge with patient and caregiver   Arrange for needed discharge resources and transportation as appropriate   Identify discharge learning needs (meds, wound care, etc)   Arrange for interpreters to assist at discharge as needed   Refer to discharge planning if patient needs post-hospital services based on physician order or complex needs related to functional status, cognitive ability or social support system    Problem: Pain  Goal: Verbalizes/displays adequate comfort level or baseline comfort level  4/24/2025 2022 by Una Rizzo RN  Outcome: Progressing  Flowsheets (Taken 4/24/2025 2022)  Verbalizes/displays adequate comfort level or baseline comfort level:   Encourage patient to monitor pain and request assistance   Assess pain using appropriate pain scale   Administer analgesics based on type and severity of pain and evaluate response   Implement non-pharmacological measures as appropriate and evaluate response   Consider cultural and social influences on pain and pain management   Notify Licensed Independent Practitioner if interventions unsuccessful or patient reports new pain     Problem: Skin/Tissue Integrity  Goal: Skin integrity remains intact  Description: 1.  Monitor for areas of redness and/or skin breakdown2.  Assess vascular access sites hourly3.  Every 4-6 hours minimum:  Change oxygen saturation probe site4.  Every 4-6 hours:  If on nasal continuous positive airway pressure, respiratory therapy assess nares and determine need for appliance change or resting period  Outcome: Progressing  Flowsheets (Taken 4/24/2025 2022)  Skin Integrity Remains Intact:   Monitor for areas of redness and/or skin breakdown

## 2025-04-25 NOTE — PLAN OF CARE
Problem: Discharge Planning  Goal: Discharge to home or other facility with appropriate resources  Outcome: Not Progressing  Flowsheets (Taken 4/25/2025 0700 by Varsha Campoverde)  Discharge to home or other facility with appropriate resources:   Identify barriers to discharge with patient and caregiver   Identify discharge learning needs (meds, wound care, etc)   Arrange for needed discharge resources and transportation as appropriate   Arrange for interpreters to assist at discharge as needed   Refer to discharge planning if patient needs post-hospital services based on physician order or complex needs related to functional status, cognitive ability or social support system     Problem: Pain  Goal: Verbalizes/displays adequate comfort level or baseline comfort level  Outcome: Not Progressing  Flowsheets (Taken 4/25/2025 0700 by Varsha Campoverde)  Verbalizes/displays adequate comfort level or baseline comfort level:   Encourage patient to monitor pain and request assistance   Administer analgesics based on type and severity of pain and evaluate response   Assess pain using appropriate pain scale   Implement non-pharmacological measures as appropriate and evaluate response   Consider cultural and social influences on pain and pain management   Notify Licensed Independent Practitioner if interventions unsuccessful or patient reports new pain     Problem: Skin/Tissue Integrity  Goal: Skin integrity remains intact  Description: 1.  Monitor for areas of redness and/or skin breakdown2.  Assess vascular access sites hourly3.  Every 4-6 hours minimum:  Change oxygen saturation probe site4.  Every 4-6 hours:  If on nasal continuous positive airway pressure, respiratory therapy assess nares and determine need for appliance change or resting period  Outcome: Not Progressing  Flowsheets  Taken 4/25/2025 0916 by Varsha Campoverde  Skin Integrity Remains Intact:   Monitor for areas of redness and/or skin breakdown   Assess vascular  physician order or complex needs related to functional status, cognitive ability or social support system     Problem: Pain  Goal: Verbalizes/displays adequate comfort level or baseline comfort level  Outcome: Not Progressing  Flowsheets (Taken 4/25/2025 0700 by Varsha Campoverde)  Verbalizes/displays adequate comfort level or baseline comfort level:   Encourage patient to monitor pain and request assistance   Administer analgesics based on type and severity of pain and evaluate response   Assess pain using appropriate pain scale   Implement non-pharmacological measures as appropriate and evaluate response   Consider cultural and social influences on pain and pain management   Notify Licensed Independent Practitioner if interventions unsuccessful or patient reports new pain     Problem: Skin/Tissue Integrity  Goal: Skin integrity remains intact  Description: 1.  Monitor for areas of redness and/or skin breakdown2.  Assess vascular access sites hourly3.  Every 4-6 hours minimum:  Change oxygen saturation probe site4.  Every 4-6 hours:  If on nasal continuous positive airway pressure, respiratory therapy assess nares and determine need for appliance change or resting period  Outcome: Not Progressing  Flowsheets  Taken 4/25/2025 0916 by Varsha Campoverde  Skin Integrity Remains Intact:   Monitor for areas of redness and/or skin breakdown   Assess vascular access sites hourly   Every 4-6 hours minimum:  Change oxygen saturation probe site   Every 4-6 hours:  If on nasal continuous positive airway pressure, assess nares and determine need for appliance change or resting period   Turn and reposition as indicated   Assess need for specialty bed   Positioning devices   Pressure redistribution bed/mattress (bed type)   Check visual cues for pain   Monitor skin under medical devices  Taken 4/25/2025 0700 by Varsha Campoverde  Skin Integrity Remains Intact:   Monitor for areas of redness and/or skin breakdown   Assess vascular

## 2025-04-25 NOTE — PROGRESS NOTES
Neuro ICU History & Physical    Patient Name: Tram Bailey  Patient : 1935  Room/Bed: 0119/0119-01  Code Status: DNR-CCA  Allergies: No Known Allergies    CHIEF COMPLAINT     Found down, unresponsiveness    HPI    History Obtained From: Chart review    The patient is a 89 y.o. female with history of hypertension, hyperlipidemia, CAD, PE on Xarelto, TIA, CKD who presents as a transfer from [facility] for intraventricular hemorrhage on CT scan of the head.  According to the notes at [facility], patient's last known well was approximately 1700 yesterday.  Her family came over today to bring her some zucchini bread and found her in her bed unresponsive.  EMS was called and they transported patient to [facility].  On arrival to [facility], patient had GCS of 8 and patient was intubated for airway protection.  CT scan of the head showed a large intraventricular hemorrhage. Patient did receive 1 g of TXA as well as 1 unit of FFP at the [facility] facility prior to transfer.  Patient also was noted to have a leukocytosis of 20 and did receive a dose of Unasyn due to concerns for possible aspiration.    On evaluation in the trauma bay, patient is intubated and sedated on propofol and fentanyl.  Patient was localizing to pain in her bilateral upper extremities.  Pupils equal and reactive.  No other signs of trauma noted.    Patient did receive a gram of calcium in the trauma bay due to peaked T waves noted on EKG.  Balfaxar was also ordered.    Repeat chest x-ray showed ET tube in satisfactory position as well as OG in the gastric fundus.    ICH score: 4    Admitted to ICU From: ER  Reason for ICU Admission: Intraparenchymal hemorrhage, intubated due to low GCS at [facility] facility       PATIENT HISTORY   Past Medical History:        Diagnosis Date    Hyperlipemia        Past Surgical History:        Procedure Laterality Date    CATARACT REMOVAL Bilateral     HIP SURGERY Bilateral     TOTAL HIP     hemorrhage on CT scan of the head.  Patient was found down by family at home earlier today.  Intubated for airway protection at [facility].  Patient did receive 1 g of TXA as well as 1 unit of FFP prior to transfer.    Patient care will be discussed with attending, will reevaluate patient along with attending.     PLAN/MEDICAL DECISION MAKING:    NEUROLOGIC:  Large left hemispheric bleed with intraventricular extension on Xarelto  - CT head at [facility] showing ICH, IVH  - ICH score of 4  - Patient did receive 1 g of TXA and 1 unit of FFP prior to transfer at [facility] for reversal.  She also received Balfaxar upon arrival here in the ER.  - Neurosurgery consulted.  They met with family at bedside to discuss EVD placement.  Family leaning towards palliative care at this time.   - DNR CC, pending hospice consult and recs    DISPOSITION: Remain ICU, currently DNR CC      Maida Bonilla MD  Neuro Critical Care Service   Pager [phone]  4/25/2025     6:28 AM

## 2025-04-26 NOTE — DISCHARGE SUMMARY
Neuro Critical Care   Discharge Summary      PATIENT NAME: Tram Bailey  YOB: 1935  MEDICAL RECORD NO. [id number]  DATE: 4/26/2025  PRIMARY CARE PHYSICIAN: Mayte Ramirez DO  ADMISSION DATE: 4/24/2025  DISCHARGE DATE:  4/25/2025  DISCHARGE DIAGNOSIS:   Patient Active Problem List   Diagnosis Code    History of deep vein thrombosis (DVT) of lower extremity Z86.718    Essential hypertension I10    Mixed hyperlipidemia E78.2    Overactive bladder N32.81    Intraventricular hemorrhage (HCC) I61.5    Dante coma scale total score 3-8 ([facility]) R40.2430    Obstructive hydrocephalus (HCC) G91.1       HOSPITAL COURSE     Tram Bailey is a 89 y.o. yo female who presented to [facility] on 4/24/2025  2:48 PM with history of hypertension, hyperlipidemia, CAD, PE on Xarelto, TIA, CKD who presents as a transfer from [facility] for intraventricular hemorrhage on CT scan of the head.  According to the notes at [facility], patient's last known well was approximately 1700 yesterday.  Her family came over today to bring her some zucchini bread and found her in her bed unresponsive.  EMS was called and they transported patient to [facility].  On arrival to [facility], patient had GCS of 8 and patient was intubated for airway protection.  CT scan of the head showed a large intraventricular hemorrhage. Patient did receive 1 g of TXA as well as 1 unit of FFP at the outlying facility prior to transfer.  Patient also was noted to have a leukocytosis of 20 and did receive a dose of Unasyn due to concerns for possible aspiration.     On evaluation in the trauma bay, patient is intubated and sedated on propofol and fentanyl.  Patient was localizing to pain in her bilateral upper extremities.  Pupils equal and reactive.  No other signs of trauma noted.     Patient did receive a gram of calcium in the trauma bay due to peaked T waves noted on EKG.  Balfaxar was also ordered.     Repeat chest x-ray showed ET tube in  satisfactory position as well as OG in the gastric fundus. .      Labs and imaging were followed.  At time of discharge, Tram Bailey was tolerating a diet, having bowel movements, and is in stable condition to be discharged to Hospice.        PROCEDURES:      PHYSICAL EXAMINATION        Discharge Vitals:  weight is 51.8 kg (114 lb 3.2 oz). Her temperature is 100.9 °F (38.3 °C) (abnormal). Her blood pressure is 165/96 (abnormal) and her pulse is 103 (abnormal). Her respiration is 29 and oxygen saturation is 91%.   CONSTITUTIONAL:  Intubated and sedated.  Well developed, well nourished.  No signs of trauma.   HEAD:  normocephalic, atraumatic    EYES:  PERRLA   ENT:  moist mucous membranes   LUNGS:  No respiratory distress   CARDIOVASCULAR:  RRR   ABDOMEN:  Soft, no rigidity   NEUROLOGIC:  Mental Status: Intubated and sedated             Cranial Nerves:    PERRL     Motor Exam:    Localizing to pain in bilateral upper extremities  Not currently moving her lower extremities but patient was still on low-dose propofol and fentanyl infusions      SKIN No obvious ecchymosis, rashes, or lesions    NIH Stroke Scale Total (if not done complete detailed one below):    1a.  Level of consciousness:  3 - responds only with reflex motor or automatic effects or totally unresponsive, flaccid, areflexic  1b.  Level of consciousness questions:  2 - answers neither question correctly  1c.  Level of consciousness questions:  2 - performs neither task correctly  2.    Best Gaze:  0 - normal  3.    Visual:  3 - bilateral hemianopia (blind including cortical blindness  4.    Facial Palsy:  1 - minor paralysis (flattened nasolabial fold, asymmetric on smiling)  5a.  Motor left arm:  4 - no movement  5b.  Motor right arm:  4 - no movement  6a.  Motor left le - no movement  6b.  Motor right le - no movement  7.    Limb Ataxia:  0 - absent  8.    Sensory:  0 - normal; no sensory loss  9.    Best Language:  3 - mute, global